# Patient Record
Sex: MALE | Race: WHITE | NOT HISPANIC OR LATINO | URBAN - METROPOLITAN AREA
[De-identification: names, ages, dates, MRNs, and addresses within clinical notes are randomized per-mention and may not be internally consistent; named-entity substitution may affect disease eponyms.]

---

## 2019-06-13 ENCOUNTER — APPOINTMENT (RX ONLY)
Dept: URBAN - METROPOLITAN AREA CLINIC 153 | Facility: CLINIC | Age: 69
Setting detail: DERMATOLOGY
End: 2019-06-13

## 2019-06-13 DIAGNOSIS — L82.1 OTHER SEBORRHEIC KERATOSIS: ICD-10-CM

## 2019-06-13 DIAGNOSIS — D485 NEOPLASM OF UNCERTAIN BEHAVIOR OF SKIN: ICD-10-CM

## 2019-06-13 DIAGNOSIS — L81.4 OTHER MELANIN HYPERPIGMENTATION: ICD-10-CM

## 2019-06-13 DIAGNOSIS — L30.9 DERMATITIS, UNSPECIFIED: ICD-10-CM

## 2019-06-13 PROBLEM — M12.9 ARTHROPATHY, UNSPECIFIED: Status: ACTIVE | Noted: 2019-06-13

## 2019-06-13 PROBLEM — D37.01 NEOPLASM OF UNCERTAIN BEHAVIOR OF LIP: Status: ACTIVE | Noted: 2019-06-13

## 2019-06-13 PROBLEM — I10 ESSENTIAL (PRIMARY) HYPERTENSION: Status: ACTIVE | Noted: 2019-06-13

## 2019-06-13 PROCEDURE — 40490 BIOPSY OF LIP: CPT

## 2019-06-13 PROCEDURE — ? COUNSELING

## 2019-06-13 PROCEDURE — ? BIOPSY BY SHAVE METHOD

## 2019-06-13 PROCEDURE — ? TREATMENT REGIMEN

## 2019-06-13 PROCEDURE — 99202 OFFICE O/P NEW SF 15 MIN: CPT | Mod: 25

## 2019-06-13 ASSESSMENT — LOCATION SIMPLE DESCRIPTION DERM
LOCATION SIMPLE: VENTRAL TONGUE
LOCATION SIMPLE: LEFT BUCCAL MUCOSA
LOCATION SIMPLE: RIGHT CHEEK
LOCATION SIMPLE: LEFT CHEEK
LOCATION SIMPLE: LEFT LATERAL TONGUE
LOCATION SIMPLE: RIGHT BUCCAL MUCOSA
LOCATION SIMPLE: LEFT LIP

## 2019-06-13 ASSESSMENT — LOCATION ZONE DERM
LOCATION ZONE: MUCOUS_MEMBRANE
LOCATION ZONE: LIP
LOCATION ZONE: FACE

## 2019-06-13 ASSESSMENT — LOCATION DETAILED DESCRIPTION DERM
LOCATION DETAILED: LEFT LATERAL TONGUE
LOCATION DETAILED: RIGHT INFERIOR CENTRAL MALAR CHEEK
LOCATION DETAILED: RIGHT VENTRAL TONGUE
LOCATION DETAILED: LEFT INFERIOR VERMILION LIP
LOCATION DETAILED: LEFT INFERIOR CENTRAL MALAR CHEEK
LOCATION DETAILED: RIGHT BUCCAL MUCOSA
LOCATION DETAILED: LEFT BUCCAL MUCOSA

## 2019-06-13 NOTE — PROCEDURE: TREATMENT REGIMEN
Initiate Treatment: Magic mouth wash : 80 ml viscous lidocaine 2% , 80 ml diphenhydramine 12.5 mg per 5 ml elixir, 80 ml nystatin 100,000 U suspension, 80 ml prednisolone 15 mg per 5 ml solution, 80 ml distilled water. Sig: swish, gargle and spit one to two teaspoonfuls every 6 hours as needed. \\nPrescription was hand written and patient will be taking it to the pharmacy himself.
Otc Regimen: Apply Aquaphor On the lips daily
Detail Level: Simple

## 2019-06-13 NOTE — HPI: SKIN LESIONS
How Severe Is Your Skin Lesion?: mild
Have Your Skin Lesions Been Treated?: been treated
Is This A New Presentation, Or A Follow-Up?: Skin Lesions
Additional History: Patient had prednisone medication prescription before and stated that it was only temporary fix and kept him from sleeping.

## 2019-06-24 ENCOUNTER — APPOINTMENT (RX ONLY)
Dept: URBAN - METROPOLITAN AREA CLINIC 153 | Facility: CLINIC | Age: 69
Setting detail: DERMATOLOGY
End: 2019-06-24

## 2019-06-24 DIAGNOSIS — L30.9 DERMATITIS, UNSPECIFIED: ICD-10-CM

## 2019-06-24 DIAGNOSIS — L57.0 ACTINIC KERATOSIS: ICD-10-CM

## 2019-06-24 PROCEDURE — ? TREATMENT REGIMEN

## 2019-06-24 PROCEDURE — ? DEFER

## 2019-06-24 PROCEDURE — ? COUNSELING

## 2019-06-24 PROCEDURE — 99213 OFFICE O/P EST LOW 20 MIN: CPT

## 2019-06-24 ASSESSMENT — LOCATION DETAILED DESCRIPTION DERM
LOCATION DETAILED: LEFT BUCCAL MUCOSA
LOCATION DETAILED: RIGHT BUCCAL MUCOSA
LOCATION DETAILED: LEFT VENTRAL TONGUE
LOCATION DETAILED: RIGHT ANTERIOR TONGUE
LOCATION DETAILED: LEFT INFERIOR VERMILION LIP

## 2019-06-24 ASSESSMENT — LOCATION SIMPLE DESCRIPTION DERM
LOCATION SIMPLE: ANTERIOR TONGUE
LOCATION SIMPLE: LEFT LIP
LOCATION SIMPLE: LEFT BUCCAL MUCOSA
LOCATION SIMPLE: RIGHT BUCCAL MUCOSA
LOCATION SIMPLE: VENTRAL TONGUE

## 2019-06-24 ASSESSMENT — LOCATION ZONE DERM
LOCATION ZONE: MUCOUS_MEMBRANE
LOCATION ZONE: LIP

## 2019-06-24 NOTE — PROCEDURE: TREATMENT REGIMEN
Plan: Patient was offered triamcinolone dental paste 0.1% to use on the open lesions on the lips and in the mouth area and he has declined stating the he used the medicaion before and it does not help.\\n\\nWe have discussed that we have received records from [facility] and there was a biopsy done in order to investigate if the lesions in the mouth are related to lichen planus, and the biopsy results as well as DIF studies were negative for lichen planus.\\n\\nPatient was advised that in the records that we have received from Dr Jordan there is no mentioning of lichen planus diagnosis.\\n\\nPatient was advised to schedule with Dr. Cesar for further evaluation and management and possible additional investigational biopsy if necessary. Plan: Patient was offered triamcinolone dental paste 0.1% to use on the open lesions on the lips and in the mouth area and he has declined stating the he used the medicaion before and it does not help.\\n\\nWe have discussed that we have received records from [facility] and there was a biopsy done in order to investigate if the lesions in the mouth are related to lichen planus, and the biopsy results as well as DIF studies were negative for lichen planus.\\n\\nPatient was advised that in the records that we have received from Dr Jordan there is no mentioning of lichen planus diagnosis.\\n\\nPatient was advised to schedule with Dr. Cesar for further evaluation and management and possible additional investigational biopsy if necessary.

## 2019-06-24 NOTE — PROCEDURE: DEFER
Reason To Defer Override: Patient has new ulcer on the lip and cryotherapy was declined today from the s/p biopsy site
Introduction Text (Please End With A Colon): The following procedure was deferred:
Instructions (Optional): Patient will schedule with Dr. Cesar for further evaluation and management
Detail Level: Detailed

## 2019-06-24 NOTE — PROCEDURE: TREATMENT REGIMEN
Continue Regimen: Magic mouth wash : 80 ml viscous lidocaine 2% , 80 ml diphenhydramine 12.5 mg per 5 ml elixir, 80 ml nystatin 100,000 U suspension, 80 ml prednisolone 15 mg per 5 ml solution, 80 ml distilled water. swish, gargle and spit one to two teaspoonfuls every 6 hours as needed.